# Patient Record
Sex: FEMALE | NOT HISPANIC OR LATINO | Employment: UNEMPLOYED | URBAN - METROPOLITAN AREA
[De-identification: names, ages, dates, MRNs, and addresses within clinical notes are randomized per-mention and may not be internally consistent; named-entity substitution may affect disease eponyms.]

---

## 2024-03-04 ENCOUNTER — OFFICE VISIT (OUTPATIENT)
Dept: AUDIOLOGY | Age: 64
End: 2024-03-04
Payer: MEDICARE

## 2024-03-04 DIAGNOSIS — H90.3 SENSORY HEARING LOSS, BILATERAL: Primary | ICD-10-CM

## 2024-03-04 PROCEDURE — 92567 TYMPANOMETRY: CPT

## 2024-03-04 PROCEDURE — 92540 BASIC VESTIBULAR EVALUATION: CPT

## 2024-03-04 PROCEDURE — 92537 CALORIC VSTBLR TEST W/REC: CPT

## 2024-03-04 NOTE — PROGRESS NOTES
Videonystagmography (VNG) Evaluation    Name:  Mercedes Tang  :  1960  Age:  63 y.o.  MRN:  [id number]  Date of Evaluation: 24     HISTORY:     Reason for visit: Dizziness    Mercedes Tang is seen today at the request of Dr. Brown for an evaluation of balance. Today, Mercedes reported that onset of symptoms began about a month ago. Patient is diagnosed with degenerate disc disease, poor vision, cataracts, and chronic pain. She has had 3 back/neck surgeries. She is on many medications that we recommend coming off of before testing, but patient is unable to.She experiences lightheadedness, spinning, loss of balance and headaches lasting days. She has seen Physical Therapy while being in patient.    EVALUATION:    Interpret with caution due to current medications.    Interpret with caution due to poor tracking caused by     Tympanometry:   Right: Type A; normal middle ear pressure and static compliance    Left: Type A; normal middle ear pressure and static compliance     Oculomotor battery:    Gaze:          Right: Normal        Left: Normal         Up: Normal        Down: Normal      Tracking: Abnormal - interpret with caution due to poor tracking    Saccades: Abnormal - Interpret with caution due to poor tracking     Optokinetic: Abnormal - interpret with caution due to poor tracking    Positioning/Positionals:     [place]ota Quinteros Cobb:    Right: Negative      Left: Negative    **patient did not go full speed due to back and neck issues.   No subjective dizziness.      Positionals:     Supine: Normal    Head Right: Normal - 2 UB    Head Left: Normal      Calorics: (Normal response <25% difference)    [place] Caloric Irrigation: Normal , failure to fixate left cool and left warm    IMPRESSIONS:     Abnormal: Central indication - interpret with caution due to poor tracking    RECOMMENDATIONS:     1) Follow-up with referring provider to review today's results.  2) Continue to monitor dizziness symptoms.  If symptoms worsen or fail to improve prior to follow-up with their referring provider, contact your primary care/or referring provider and/or urgent medical attention should be considered.  3) Fall precautions were discussed at length with the patient. Most test effects are expected to subside shortly after testing is completed, it was recommended that they use caution moving around for the remainder of the day.   4) Consider vestibular physical therapy evaluation and rehabilitation through [facility]        Gabbi Daniel., [facility]  Clinical Audiologist  [facility]  [address] [id number]